# Patient Record
Sex: MALE | URBAN - METROPOLITAN AREA
[De-identification: names, ages, dates, MRNs, and addresses within clinical notes are randomized per-mention and may not be internally consistent; named-entity substitution may affect disease eponyms.]

---

## 2019-01-07 ENCOUNTER — HOSPITAL ENCOUNTER (EMERGENCY)
Facility: CLINIC | Age: 29
Discharge: HOME | End: 2019-01-07
Admitting: PHYSICIAN ASSISTANT

## 2019-01-07 ENCOUNTER — APPOINTMENT (OUTPATIENT)
Dept: ULTRASOUND IMAGING | Facility: CLINIC | Age: 29
End: 2019-01-07

## 2019-01-07 VITALS
SYSTOLIC BLOOD PRESSURE: 130 MMHG | OXYGEN SATURATION: 99 % | RESPIRATION RATE: 16 BRPM | TEMPERATURE: 99.8 F | DIASTOLIC BLOOD PRESSURE: 78 MMHG | HEART RATE: 90 BPM

## 2019-01-07 DIAGNOSIS — N50.819 TESTICULAR PAIN: ICD-10-CM

## 2019-01-07 DIAGNOSIS — N45.1 EPIDIDYMITIS: ICD-10-CM

## 2019-01-07 DIAGNOSIS — N45.2 ORCHITIS: ICD-10-CM

## 2019-01-07 LAB
ALBUMIN UR-MCNC: NEGATIVE MG/DL
APPEARANCE UR: CLEAR
BILIRUB UR QL STRIP: NEGATIVE
COLOR UR AUTO: YELLOW
GLUCOSE UR STRIP-MCNC: NEGATIVE MG/DL
HGB UR QL STRIP: NEGATIVE
KETONES UR STRIP-MCNC: NEGATIVE MG/DL
LEUKOCYTE ESTERASE UR QL STRIP: NEGATIVE
MUCOUS THREADS #/AREA URNS LPF: PRESENT /LPF
NITRATE UR QL: NEGATIVE
PH UR STRIP: 6.5 PH (ref 5–7)
RBC #/AREA URNS AUTO: <1 /HPF (ref 0–2)
SOURCE: ABNORMAL
SP GR UR STRIP: 1.02 (ref 1–1.03)
UROBILINOGEN UR STRIP-MCNC: NORMAL MG/DL (ref 0–2)
WBC #/AREA URNS AUTO: 1 /HPF (ref 0–5)

## 2019-01-07 PROCEDURE — 93976 VASCULAR STUDY: CPT

## 2019-01-07 PROCEDURE — 99284 EMERGENCY DEPT VISIT MOD MDM: CPT | Mod: 25

## 2019-01-07 PROCEDURE — 81001 URINALYSIS AUTO W/SCOPE: CPT | Performed by: PHYSICIAN ASSISTANT

## 2019-01-07 ASSESSMENT — ENCOUNTER SYMPTOMS
ABDOMINAL PAIN: 1
DYSURIA: 0

## 2019-01-08 RX ORDER — LEVOFLOXACIN 500 MG/1
500 TABLET, FILM COATED ORAL DAILY
Qty: 10 TABLET | Refills: 0 | Status: SHIPPED | OUTPATIENT
Start: 2019-01-08 | End: 2019-01-18

## 2019-01-08 NOTE — DISCHARGE INSTRUCTIONS
Wear supportive measures such as a jockstrap to help alleviate pain.  Call urology tomorrow and schedule follow-up.  Return to the ED for any changing worsening symptoms, worsening pain or swelling, fevers >103, new concerns.

## 2019-01-08 NOTE — ED PROVIDER NOTES
History     Chief Complaint:  Groin Swelling    HPI   Colin Bermudez is a 28 year old male who presents to the emergency department today for evaluation of groin swelling. The patient reports that last night around 1800 he started experiencing some lower abdominal pain. He states he now feels pain the left testicle. The patient denies rash, lesion, dysuria, or penile discharge. No concerns for STD exposure.     Allergies:  No Known Drug Allergies    Medications:    Medications reviewed. No current medications.     Past Medical History:    Medical history reviewed. No pertinent medical history.    Past Surgical History:    Hernia Repair  Orthopedic surgery    Family History:    Family history reviewed. No pertinent family history.      Social History:  Smoking Status: Never Smoker  Smokeless Tobacco: Never Used  Alcohol Use: Positive   Marital Status:      Review of Systems   Gastrointestinal: Positive for abdominal pain.   Genitourinary: Positive for testicular pain. Negative for discharge, dysuria and penile pain.   All other systems reviewed and are negative.    Physical Exam     Patient Vitals for the past 24 hrs:   BP Temp Temp src Pulse Resp SpO2   01/07/19 2042 130/78 -- -- -- -- --   01/07/19 2040 -- 99.8  F (37.7  C) Temporal 90 16 99 %     Physical Exam  General: Well appearing, well nourished. Normal mood and affect.  Skin: Good turgor, no rash, no unusual bruising or prominent lesions.  HEENT: Head: Normocephalic, atraumatic, no visible masses.   Eyes: Conjunctiva clear.  Cardiac: Normal rate and regular rhythm, no murmur or gallop.   Lungs: Clear to auscultation.  Abdomen: Bowel sounds normal, no tenderness, organomegaly, masses, or hernia. No guarding or rebound tenderness.   Musculoskeletal: Normal gait and station. No calf tenderness or swelling.   Neurologic: Oriented x 3. GCS: 15.  Psychiatric: Intact recent and remote memory, judgment and insight, normal mood and affect.    G/U: Tenderness  palpation throughout the left testicle.  No appreciable masses or edema.  No skin lesions or vesicles.  Penis circumcised without lesions, urethral meatus normal location without discharge.    Emergency Department Course     Imaging:  Radiology findings were communicated with the patient who voiced understanding of the findings.    US Testicular & Scrotum w Doppler Ltd   Final Result   IMPRESSION: Increase blood flow to the left testicle and epididymis   consistent with orchitis and epididymitis.      CHRISTI CARDENAS MD        Laboratory:  Laboratory findings were communicated with the patient who voiced understanding of the findings.    UA with micro: mucous present o/w negative     Emergency Department Course:    ED Course as of Jan 08 0010 Mon Jan 07, 2019 2051 I performed a thorough exam of the patient.     2056 The patient provided a urine sample here in the emergency department. This was sent for laboratory testing, findings above.     2137 The patient was sent for a US testicular and scrotum while in the emergency department, results above.      2303 I personally reviewed the laboratory and imaging results with the patient and answered all related questions prior to discharge.      2320 Imaging results returned, I attempted to recheck the patient.  However, he had left his room without being registered and without receiving discharge paperwork.  Patient attempted to be contacted, no information in health system.         Impression & Plan      Medical Decision Making:  Colin Bermudez is a 28 year old male who presents to the emergency department today for evaluation of left testicular pain and swelling.  Details of the patient's history can be noted in the HPI.  Differential diagnosis included epididymitis, orchitis, testicular torsion, balanitis, UTI, hernia, hydrocele, varicocele, amongst others.  Upon my exam, patient had tenderness palpation throughout the left testicle.  UA was obtained, results  not showing any signs of infection.  Ultrasound was also completed.  This returned showing epididymitis and orchitis.  There was a problem with the radiology system in the ED today.  Imaging results were taking a great deal of time to return.  Ultrasound technician initial report indicated hydrocele and varicocele without any other abnormalities.  Patient was anxious in his room, requesting to leave.  I discussed the ultrasound technician results with the patient and told him that he should follow-up with urology within the next few days.  I also called him that if the official results came back with other abnormalities that I would call him with the results.  When imaging results returned a few minutes later, patient had left the room without being discharged, receiving paperwork, being registered.  I attempted to look the patient up in [employer] system.  He has not been to this ED or [employer] system in the past.  There is no contact information for him.  I attempted multiple other means of contacting the patient.  I was able to find a work phone number for him.  I left a message requesting that he return my phone call, not leaving any patient identification/HIPAA information.  I also attempted to contact him through social media but I am not sure that messages were able to go through due to privacy measures.  I did leave a message via social media and on work phone to either call me or to follow-up with his primary care provider tomorrow.  If patient returns the phone call, he will be treated with antibiotics.    Diagnosis:    ICD-10-CM    1. Epididymitis N45.1    2. Orchitis N45.2    3. Testicular pain N50.819      Disposition:   The patient is discharged to home.     Discharge Medications:  No discharge medications    Scribe Disclosure:  I, Hien Lu, am serving as a scribe at 8:51 PM on 1/7/2019 to document services personally performed by Samantha Gutierrez PA-C based on my observations and the provider's  statements to me.       EMERGENCY DEPARTMENT       Samantha Gutierrez PA  01/08/19 0014

## 2019-01-08 NOTE — PROGRESS NOTES
Short Progress Note     I was able to get a hold of the patient today.  I informed him of his imaging results.  The patient gave me a pharmacy for which he would be able to  a prescription.  I electronically prescribed him levofloxacin, 500 mg once daily for the next 10 days for treatment of epididymitis and orchitis.  I also was able to give him follow-up information for urology over the phone.  I did give him a local provider and phone number and he will follow-up within the next few weeks.  He will return to the ED for any changing or worsening symptoms, worsening swelling, fevers, abdominal pain, nausea vomiting, new concerns.  Patient was in agreement with treatment plan.  All questions answered over phone.    RAHUL Rodriguez